# Patient Record
Sex: MALE | Race: WHITE
[De-identification: names, ages, dates, MRNs, and addresses within clinical notes are randomized per-mention and may not be internally consistent; named-entity substitution may affect disease eponyms.]

---

## 2017-07-04 ENCOUNTER — HOSPITAL ENCOUNTER (EMERGENCY)
Dept: HOSPITAL 61 - ER | Age: 27
Discharge: HOME | End: 2017-07-04
Payer: SELF-PAY

## 2017-07-04 VITALS — BODY MASS INDEX: 25.77 KG/M2 | WEIGHT: 180 LBS | HEIGHT: 70 IN

## 2017-07-04 VITALS — DIASTOLIC BLOOD PRESSURE: 78 MMHG | SYSTOLIC BLOOD PRESSURE: 141 MMHG

## 2017-07-04 DIAGNOSIS — L23.7: Primary | ICD-10-CM

## 2017-07-04 DIAGNOSIS — Z88.8: ICD-10-CM

## 2017-07-04 PROCEDURE — 96372 THER/PROPH/DIAG INJ SC/IM: CPT

## 2017-07-04 PROCEDURE — 99283 EMERGENCY DEPT VISIT LOW MDM: CPT

## 2017-07-04 NOTE — PHYS DOC
Past Medical History


Past Medical History:  No Pertinent History


Past Surgical History:  Other


Additional Past Surgical Histo:  HERNIA


Alcohol Use:  Occasionally


Drug Use:  None





Adult General


Chief Complaint


Chief Complaint:  SKIN RASH/ABSCESS





HPI


HPI





Patient is a 26  year old medical presents with poison ivy rash for 4-5 days. 

Patient states he normally has to come to the ED and get a shot.





Review of Systems


Review of Systems





Constitutional: Denies fever or chills []


Eyes: Denies change in visual acuity, redness, or eye pain []


: Denies dysuria or hematuria []


Musculoskeletal: Denies back pain or joint pain []


Integument:  poison ivy rash


Neurologic: Denies headache, focal weakness or sensory changes []


Endocrine: Denies polyuria or polydipsia []





Current Medications


Current Medications





Current Medications








 Medications


  (Trade)  Dose


 Ordered  Sig/Cuong  Start Time


 Stop Time Status Last Admin


Dose Admin


 


 Dexamethasone


 Sodium Phosphate


  (Decadron)  10 mg  1X  ONCE  7/4/17 12:45


 7/4/17 12:46   


 











Allergies


Allergies





Allergies








Uncoded Allergies Type Severity Reaction Last Updated Verified


 


  HARINDER AID Allergy Unknown  7/4/17 











Physical Exam


Physical Exam





Constitutional: Well developed, well nourished, no acute distress, non-toxic 

appearance. []


HENT: Normocephalic, atraumatic, bilateral external ears normal, oropharynx 

moist, no oral exudates, nose normal. []


Skin: Patient has moderate amount of erythematous rash on bilateral upper and 

lower extremities, moderate amount of papular rash on the abdomen, and moderate 

amount of erythematous rash on the face.


Back: No tenderness, no CVA tenderness. [] 


Extremities: No tenderness, no cyanosis, no clubbing, ROM intact, no edema. [] 


Neurologic: Alert and oriented X 3, normal motor function, normal sensory 

function, no focal deficits noted. []


Psychologic: Affect normal, judgement normal, mood normal. []





Current Patient Data


Vital Signs





 Vital Signs








  Date Time  Temp Pulse Resp B/P (MAP) Pulse Ox O2 Delivery O2 Flow Rate FiO2


 


7/4/17 12:08 98.2 81 18  99 Room Air  





 98.2       











EKG


EKG


[]





Radiology/Procedures


Radiology/Procedures


[]





Course & Med Decision Making


Course & Med Decision Making


Pertinent Labs and Imaging studies reviewed. (See chart for details)





Patient has widespread contact dermatitis rash from poison ivy. He was given 

Decadron in the ED. He was discharged with tapered dose of prednisone Pepcid 

and Benadryl. Follow-up with his own doctor in 1-2 weeks.





Dragon Disclaimer


Ga Disclaimer


This electronic medical record was generated, in whole or in part, using a 

voice recognition dictation system.





Departure


Departure


Impression:  


 Primary Impression:  


 Contact dermatitis due to poison ivy


Disposition:  01 HOME, SELF-CARE


Condition:  STABLE


Referrals:  


NO PCP (PCP)


Follow-up with your doctor in 2 weeks


Patient Instructions:  Contact Dermatitis, Easy-to-Read





Additional Instructions:  


You were seen for contact dermatitis rash from poison ivy. Follow-up with your 

doctor in the next 2 weeks. Take the prescribed medicines as ordered. Take 

Benadryl at night and antihistamine that is nonsedating during the day as well 

as pepcid until the rash clears out.


Scripts


Prednisone (PREDNISONE) 10 Mg Tablet


10 MG PO UD for PREDNISONE TAPER, #39 TAB 0 Refills


   Take 3 tablets by mouth twice a day for 3 days, then


   take 2 tablets by mouth twice a day for 3 days, then


   take 1 tablet by mouth twice a day for 3 days, then


   take 1 tablet by mouth daily x 3 days, then stop.


   Prov: DARRELL VAZ         7/4/17











DARRELL VAZ Jul 4, 2017 12:22

## 2018-04-14 ENCOUNTER — HOSPITAL ENCOUNTER (EMERGENCY)
Dept: HOSPITAL 61 - ER | Age: 28
Discharge: HOME | End: 2018-04-14
Payer: SELF-PAY

## 2018-04-14 DIAGNOSIS — Z91.018: ICD-10-CM

## 2018-04-14 DIAGNOSIS — L23.7: Primary | ICD-10-CM

## 2018-04-14 PROCEDURE — 99283 EMERGENCY DEPT VISIT LOW MDM: CPT

## 2018-04-14 PROCEDURE — 96372 THER/PROPH/DIAG INJ SC/IM: CPT

## 2018-04-14 RX ADMIN — DEXAMETHASONE SODIUM PHOSPHATE 1 MG: 4 INJECTION, SOLUTION INTRAMUSCULAR; INTRAVENOUS at 13:41
